# Patient Record
Sex: FEMALE | Race: WHITE | NOT HISPANIC OR LATINO | URBAN - METROPOLITAN AREA
[De-identification: names, ages, dates, MRNs, and addresses within clinical notes are randomized per-mention and may not be internally consistent; named-entity substitution may affect disease eponyms.]

---

## 2019-09-12 ENCOUNTER — OFFICE VISIT (OUTPATIENT)
Dept: PODIATRY | Facility: CLINIC | Age: 74
End: 2019-09-12
Payer: MEDICARE

## 2019-09-12 VITALS — DIASTOLIC BLOOD PRESSURE: 84 MMHG | HEART RATE: 72 BPM | SYSTOLIC BLOOD PRESSURE: 126 MMHG | HEIGHT: 62 IN

## 2019-09-12 DIAGNOSIS — M62.462 GASTROCNEMIUS EQUINUS, LEFT: ICD-10-CM

## 2019-09-12 DIAGNOSIS — M79.672 PAIN OF LEFT HEEL: ICD-10-CM

## 2019-09-12 DIAGNOSIS — M72.2 PLANTAR FASCIITIS, LEFT: Primary | ICD-10-CM

## 2019-09-12 PROCEDURE — 1101F PT FALLS ASSESS-DOCD LE1/YR: CPT | Mod: CPTII,S$GLB,, | Performed by: PODIATRIST

## 2019-09-12 PROCEDURE — 99999 PR PBB SHADOW E&M-NEW PATIENT-LVL III: ICD-10-PCS | Mod: PBBFAC,,, | Performed by: PODIATRIST

## 2019-09-12 PROCEDURE — 99999 PR PBB SHADOW E&M-NEW PATIENT-LVL III: CPT | Mod: PBBFAC,,, | Performed by: PODIATRIST

## 2019-09-12 PROCEDURE — 1101F PR PT FALLS ASSESS DOC 0-1 FALLS W/OUT INJ PAST YR: ICD-10-PCS | Mod: CPTII,S$GLB,, | Performed by: PODIATRIST

## 2019-09-12 PROCEDURE — 99203 OFFICE O/P NEW LOW 30 MIN: CPT | Mod: S$GLB,,, | Performed by: PODIATRIST

## 2019-09-12 PROCEDURE — 99203 PR OFFICE/OUTPT VISIT, NEW, LEVL III, 30-44 MIN: ICD-10-PCS | Mod: S$GLB,,, | Performed by: PODIATRIST

## 2019-09-12 RX ORDER — TALC
POWDER (GRAM) TOPICAL
COMMUNITY
Start: 2014-02-19

## 2019-09-12 RX ORDER — LISINOPRIL 20 MG/1
TABLET ORAL
COMMUNITY
Start: 2006-11-16

## 2019-09-12 RX ORDER — ATORVASTATIN CALCIUM 40 MG/1
TABLET, FILM COATED ORAL
COMMUNITY
Start: 2019-08-16

## 2019-09-12 RX ORDER — MELOXICAM 7.5 MG/1
TABLET ORAL
Qty: 90 TABLET | Refills: 1 | Status: SHIPPED | OUTPATIENT
Start: 2019-09-12 | End: 2020-03-11

## 2019-09-12 RX ORDER — GLUCOSAM/CHONDRO/HERB 149/HYAL 750-100 MG
TABLET ORAL
COMMUNITY

## 2019-09-12 RX ORDER — MELOXICAM 7.5 MG/1
7.5 TABLET ORAL DAILY
Qty: 30 TABLET | Refills: 1 | Status: SHIPPED | OUTPATIENT
Start: 2019-09-12 | End: 2019-09-12 | Stop reason: SDUPTHER

## 2019-09-12 RX ORDER — LEVOTHYROXINE SODIUM 50 UG/1
TABLET ORAL
COMMUNITY
Start: 2019-08-16

## 2019-09-12 NOTE — PATIENT INSTRUCTIONS
"Avoid flat shoes or barefoot walking. Recommend 1-2 " heel height.    If pain does not improve over next 4-6 weeks then return to be re-assessed. Discussed possible steroid injection.      Bent-Knee Calf Stretch    This exercise is designed to stretch and strengthen your feet and ankles. Before beginning the exercise, read through all the instructions. While exercising, breathe normally and dont bounce. If you feel any pain, stop the exercise. If pain persists, inform your healthcare provider:  · Stand an arms length away from a wall. Place the palms of your hands on the wall. Step forward about 12 inches with your ______ foot.  · Keeping toes pointed forward and both heels on the floor, bend both knees and lean forward. Hold for ___30___ seconds. Relax.  · Repeat __3____ times. Do __2-3____ sets a day.  Date Last Reviewed: 8/16/2015  © 7222-3462 [employer], [employer]. [address] [place] [address], [place] [address]. All rights reserved. This information is not intended as a substitute for professional medical care. Always follow your healthcare professional's instructions.        "

## 2019-09-13 NOTE — PROGRESS NOTES
"Subjective:      Patient ID: Fabi Cardona is a 73 y.o. female.    Chief Complaint: Heel Pain (left foot heel pain )    Fabi is a 73 y.o. female who presents to the clinic complaining of heel pain in the left foot, especially when walking and standing. The pain is described as Variable. The onset of the pain was sudden and has worsened over the past several weeks. Fabi rates the pain as moderate. She reports a history of trauma following her heel slipping off of her slipper after stepping off a curb. Prior treatments include changing shoe gear and taking tylenol pm every night with minimal relief.    Vitals:    09/12/19 1442   BP: 126/84   Pulse: 72   Height: 5' 2" (1.575 m)   PainSc:   6      History reviewed. No pertinent past medical history.    History reviewed. No pertinent surgical history.    History reviewed. No pertinent family history.    Social History     Socioeconomic History    Marital status:      Spouse name: Not on file    Number of children: Not on file    Years of education: Not on file    Highest education level: Not on file   Occupational History    Not on file   Social Needs    Financial resource strain: Not on file    Food insecurity:     Worry: Not on file     Inability: Not on file    Transportation needs:     Medical: Not on file     Non-medical: Not on file   Tobacco Use    Smoking status: Not on file   Substance and Sexual Activity    Alcohol use: Not on file    Drug use: Not on file    Sexual activity: Not on file   Lifestyle    Physical activity:     Days per week: Not on file     Minutes per session: Not on file    Stress: Not on file   Relationships    Social connections:     Talks on phone: Not on file     Gets together: Not on file     Attends [employer] service: Not on file     Active member of club or organization: Not on file     Attends meetings of clubs or organizations: Not on file     Relationship status: Not on file   Other Topics Concern    Not on " file   Social History Narrative    Not on file       Current Outpatient Medications   Medication Sig Dispense Refill    atorvastatin (LIPITOR) 40 MG tablet       diphenhydrAMINE-acetaminophen (TYLENOL PM)  mg Tab Take by mouth.      L.rhamn A-191-L.ac-B.maynor-B.suha (PROBIOTIC) 20 billion cell CpSP       levothyroxine (SYNTHROID) 50 MCG tablet       lisinopril (PRINIVIL,ZESTRIL) 20 MG tablet Take by mouth.      magnesium oxide (MAG-OX) 250 mg magnesium Tab Take by mouth.      omega 3-dha-epa-fish oil (FISH OIL) 1,000 mg (120 mg-180 mg) Cap       meloxicam (MOBIC) 7.5 MG tablet TAKE 1 TABLET BY MOUTH ONCE DAILY 90 tablet 1     No current facility-administered medications for this visit.        Review of patient's allergies indicates:  No Known Allergies      Review of Systems   Constitution: Negative for chills and night sweats.   HENT: Negative for hearing loss and nosebleeds.    Eyes: Negative for vision loss in left eye and vision loss in right eye.   Cardiovascular: Negative for chest pain and claudication.   Respiratory: Negative for cough and shortness of breath.    Endocrine: Negative for cold intolerance and heat intolerance.   Skin: Negative for color change and rash.   Musculoskeletal: Negative for gout and joint pain.   Gastrointestinal: Negative for abdominal pain and diarrhea.   Genitourinary: Negative for bladder incontinence and frequency.   Neurological: Negative for dizziness, headaches and weakness.   Psychiatric/Behavioral: Negative for altered mental status and depression.   Allergic/Immunologic: Negative for hives and persistent infections.           Objective:      Physical Exam   Constitutional: She is oriented to person, place, and time. She appears well-developed and well-nourished. No distress.   HENT:   Head: Normocephalic and atraumatic.   Eyes: Conjunctivae are normal. Right eye exhibits no discharge. Left eye exhibits no discharge.   Neck: Normal range of motion. No JVD  present. No tracheal deviation present.   Cardiovascular:   Pulses:       Dorsalis pedis pulses are 2+ on the right side, and 2+ on the left side.        Posterior tibial pulses are 2+ on the right side, and 2+ on the left side.   Musculoskeletal: She exhibits no edema, tenderness or deformity.        Right foot: There is decreased range of motion.        Left foot: There is decreased range of motion.   MMT 5/5 for DF, PF, inversion and eversion b/l.    Equinus of the left ankle (-2)  Decreased R ankle DF (+5)    TTP of the medial calcaneal tubercle and plantar calcaneus.     No pain with active or passive ROM.     Cavus foot type b/l   Feet:   Right Foot:   Skin Integrity: Negative for ulcer, blister, skin breakdown, erythema or warmth.   Left Foot:   Skin Integrity: Negative for ulcer, blister, skin breakdown, erythema or warmth.   Neurological: She is alert and oriented to person, place, and time. She has normal strength. She displays no atrophy. She exhibits normal muscle tone.   Skin: Skin is warm, dry and intact. Capillary refill takes less than 2 seconds. No rash noted. She is not diaphoretic. No cyanosis or erythema. No pallor. Nails show no clubbing.   Psychiatric: She has a normal mood and affect. Her behavior is normal. Judgment and thought content normal.             Assessment:       Encounter Diagnoses   Name Primary?    Plantar fasciitis, left Yes    Pain of left heel     Gastrocnemius equinus, left          Plan:       Fabi was seen today for heel pain.    Diagnoses and all orders for this visit:    Plantar fasciitis, left    Pain of left heel    Gastrocnemius equinus, left    Other orders  -     Discontinue: meloxicam (MOBIC) 7.5 MG tablet; Take 1 tablet (7.5 mg total) by mouth once daily.      I counseled the patient on her conditions, their implications and medical management.  - Dispensed stretching sheet and demonstrated stretching technique to the patient.  - Recommend RICE and NSAIDs.  -  Educated patient on proper shoe wear: no flat soled shoes. Need shoes with arch support.  - RTC in 4-6 weeks. Or sooner if necessary.    Jack Peguero DPM PGY2    I have personally taken the history and examined this patient and agree with the resident's note as stated as above.   Wilmer De Souza DPM, FACFAS    Recommend continued conservative care at this time with patient education focusing on behavior modification per above.  If she does not continue to improve we will consider steroid injection to the plantar left heel.        .

## 2020-03-11 RX ORDER — MELOXICAM 7.5 MG/1
TABLET ORAL
Qty: 90 TABLET | Refills: 1 | Status: SHIPPED | OUTPATIENT
Start: 2020-03-11

## 2020-08-24 ENCOUNTER — TELEPHONE (OUTPATIENT)
Dept: PODIATRY | Facility: CLINIC | Age: 75
End: 2020-08-24

## 2020-08-24 NOTE — TELEPHONE ENCOUNTER
----- Message from Yudith Dorsey sent at 8/21/2020 11:24 AM CDT -----  Contact: Cordelia [phone]  Pharmacy would like to receive an authorization on a refill for meloxicam (MOBIC) 7.5 MG tablet. Please advise.

## 2021-02-11 ENCOUNTER — IMMUNIZATION (OUTPATIENT)
Dept: PHARMACY | Facility: CLINIC | Age: 76
End: 2021-02-11
Payer: MEDICARE

## 2021-02-11 DIAGNOSIS — Z23 NEED FOR VACCINATION: Primary | ICD-10-CM

## 2021-05-28 ENCOUNTER — IMMUNIZATION (OUTPATIENT)
Dept: PHARMACY | Facility: CLINIC | Age: 76
End: 2021-05-28
Payer: MEDICARE

## 2021-05-28 DIAGNOSIS — Z23 NEED FOR VACCINATION: Primary | ICD-10-CM

## 2022-02-08 ENCOUNTER — TELEPHONE (OUTPATIENT)
Dept: PODIATRY | Facility: CLINIC | Age: 77
End: 2022-02-08
Payer: MEDICARE

## 2022-02-08 NOTE — TELEPHONE ENCOUNTER
----- Message from Levon Bucio sent at 2/8/2022 12:30 PM CST -----  Type:  Patient Requesting Referral    Who Called:Re est Pt  Does the patient already have the specialty appointment scheduled?:no  If yes, what is the date of that appointment?:no  Referral to What Specialty:podiatry   Reason for Referral:Big toe on right foot hurts   Does the patient want the referral with a specific physician?:Dr De Souza   [facility] the [facility]   Patient Requesting a Response?:yes  Would the patient rather a call back or a response via [facility]? Call   Best Call Back Number: [phone]  Additional Information:     Pt saw Dr De Souza in the past

## 2022-11-16 ENCOUNTER — OFFICE VISIT (OUTPATIENT)
Dept: PODIATRY | Facility: CLINIC | Age: 77
End: 2022-11-16
Payer: MEDICARE

## 2022-11-16 VITALS — HEART RATE: 63 BPM | SYSTOLIC BLOOD PRESSURE: 136 MMHG | DIASTOLIC BLOOD PRESSURE: 85 MMHG | HEIGHT: 62 IN

## 2022-11-16 DIAGNOSIS — B35.3 TINEA PEDIS OF LEFT FOOT: Primary | ICD-10-CM

## 2022-11-16 PROCEDURE — 99203 PR OFFICE/OUTPT VISIT, NEW, LEVL III, 30-44 MIN: ICD-10-PCS | Mod: S$GLB,,, | Performed by: STUDENT IN AN ORGANIZED HEALTH CARE EDUCATION/TRAINING PROGRAM

## 2022-11-16 PROCEDURE — 1126F PR PAIN SEVERITY QUANTIFIED, NO PAIN PRESENT: ICD-10-PCS | Mod: CPTII,S$GLB,, | Performed by: STUDENT IN AN ORGANIZED HEALTH CARE EDUCATION/TRAINING PROGRAM

## 2022-11-16 PROCEDURE — 3075F PR MOST RECENT SYSTOLIC BLOOD PRESS GE 130-139MM HG: ICD-10-PCS | Mod: CPTII,S$GLB,, | Performed by: STUDENT IN AN ORGANIZED HEALTH CARE EDUCATION/TRAINING PROGRAM

## 2022-11-16 PROCEDURE — 3079F PR MOST RECENT DIASTOLIC BLOOD PRESSURE 80-89 MM HG: ICD-10-PCS | Mod: CPTII,S$GLB,, | Performed by: STUDENT IN AN ORGANIZED HEALTH CARE EDUCATION/TRAINING PROGRAM

## 2022-11-16 PROCEDURE — 3075F SYST BP GE 130 - 139MM HG: CPT | Mod: CPTII,S$GLB,, | Performed by: STUDENT IN AN ORGANIZED HEALTH CARE EDUCATION/TRAINING PROGRAM

## 2022-11-16 PROCEDURE — 1126F AMNT PAIN NOTED NONE PRSNT: CPT | Mod: CPTII,S$GLB,, | Performed by: STUDENT IN AN ORGANIZED HEALTH CARE EDUCATION/TRAINING PROGRAM

## 2022-11-16 PROCEDURE — 1159F MED LIST DOCD IN RCRD: CPT | Mod: CPTII,S$GLB,, | Performed by: STUDENT IN AN ORGANIZED HEALTH CARE EDUCATION/TRAINING PROGRAM

## 2022-11-16 PROCEDURE — 3079F DIAST BP 80-89 MM HG: CPT | Mod: CPTII,S$GLB,, | Performed by: STUDENT IN AN ORGANIZED HEALTH CARE EDUCATION/TRAINING PROGRAM

## 2022-11-16 PROCEDURE — 99999 PR PBB SHADOW E&M-EST. PATIENT-LVL III: CPT | Mod: PBBFAC,,, | Performed by: STUDENT IN AN ORGANIZED HEALTH CARE EDUCATION/TRAINING PROGRAM

## 2022-11-16 PROCEDURE — 99999 PR PBB SHADOW E&M-EST. PATIENT-LVL III: ICD-10-PCS | Mod: PBBFAC,,, | Performed by: STUDENT IN AN ORGANIZED HEALTH CARE EDUCATION/TRAINING PROGRAM

## 2022-11-16 PROCEDURE — 99203 OFFICE O/P NEW LOW 30 MIN: CPT | Mod: S$GLB,,, | Performed by: STUDENT IN AN ORGANIZED HEALTH CARE EDUCATION/TRAINING PROGRAM

## 2022-11-16 PROCEDURE — 1159F PR MEDICATION LIST DOCUMENTED IN MEDICAL RECORD: ICD-10-PCS | Mod: CPTII,S$GLB,, | Performed by: STUDENT IN AN ORGANIZED HEALTH CARE EDUCATION/TRAINING PROGRAM

## 2022-11-16 RX ORDER — CLOTRIMAZOLE AND BETAMETHASONE DIPROPIONATE 10; .64 MG/G; MG/G
CREAM TOPICAL 2 TIMES DAILY
Qty: 45 G | Refills: 3 | Status: SHIPPED | OUTPATIENT
Start: 2022-11-16

## 2022-11-16 NOTE — PROGRESS NOTES
Subjective:      Patient ID: Fabi Cardona is a 76 y.o. female.    Chief Complaint: Foot Problem (Left ft more worse as right foot, think she has plantar warts) and Nail Problem    Fabi is a 76 y.o. female who presents to the podiatry clinic  with complaint of  left foot pain. Onset of the symptoms was several weeks ago. Precipitating event: none known. Current symptoms include:  dry skin to bottom of left foot . Aggravating factors:  none . Symptoms have stabilized. Patient has had no prior foot problems. Evaluation to date: none. Treatment to date: none. Patients rates pain 0/10 on pain scale.    Review of Systems   Constitutional: Negative for chills, decreased appetite, diaphoresis and fever.   HENT:  Negative for congestion and hearing loss.    Cardiovascular:  Negative for chest pain, claudication, leg swelling and syncope.   Respiratory:  Negative for cough and shortness of breath.    Skin:  Positive for color change, dry skin, nail changes and rash. Negative for flushing and poor wound healing.   Musculoskeletal:  Negative for arthritis, back pain, joint pain and joint swelling.   Gastrointestinal:  Negative for nausea and vomiting.   Neurological:  Negative for focal weakness, numbness, paresthesias and weakness.   Psychiatric/Behavioral:  Negative for altered mental status. The patient is not nervous/anxious.          Objective:      Physical Exam  Constitutional:       General: She is not in acute distress.     Appearance: She is well-developed. She is not diaphoretic.   Cardiovascular:      Comments: Dorsalis pedis and posterior tibial pulses are within normal limits. Skin temperature is within normal limits. Toes are cool to touch and feet are warm proximally. Hair growth is within normal limits. Skin is mildly atrophic and with mild hyperpigmentation. No edema noted. Telangiectasias bilaterally.  Musculoskeletal:         General: No tenderness.      Comments: Adequate joint range of motion without  pain, limitation, nor crepitation to bilateral feet and ankle joints. Muscle strength is 5/5 in all groups bilaterally.       Lymphadenopathy:      Comments: Negative lymphangitic streaking    Skin:     General: Skin is warm and dry.      Findings: Rash present. No lesion.      Comments: Skin is warm and dry, no acute signs of infection noted. No open wounds, macerations or hyperkeratotic lesions, bilaterally.     Toenails are well trimmed and and mildy thickened.     Dry erythematous rash to plantar right foot      Neurological:      Mental Status: She is alert and oriented to person, place, and time.      Motor: No abnormal muscle tone.      Comments: Light touch within normal limits.    Psychiatric:         Behavior: Behavior normal.         Thought Content: Thought content normal.         Judgment: Judgment normal.             Assessment:       Encounter Diagnosis   Name Primary?    Tinea pedis of left foot Yes         Plan:       Fabi was seen today for foot problem and nail problem.    Diagnoses and all orders for this visit:    Tinea pedis of left foot    Other orders  -     clotrimazole-betamethasone 1-0.05% (LOTRISONE) cream; Apply topically 2 (two) times daily.    I counseled the patient on her conditions, their implications and medical management.    Discussed importance of keeping feet dry and changing socks and shoes on a regular basis to prevent spread of foot fungus. Rx Lotrisone, consider referral to Dermatology if no improvement.     Shoe inspection. General Foot Education. Patient instructed on proper foot hygiene. We discussed wearing proper shoe gear, daily foot inspections, never walking without protective shoe gear, never putting sharp instruments to feet. Patient reminded of the importance of good nutrition, weight loss if needed to help alleviate foot pressure/pain     Return to clinic PRN